# Patient Record
Sex: MALE | Race: WHITE | Employment: PART TIME | URBAN - METROPOLITAN AREA
[De-identification: names, ages, dates, MRNs, and addresses within clinical notes are randomized per-mention and may not be internally consistent; named-entity substitution may affect disease eponyms.]

---

## 2017-03-29 ENCOUNTER — TELEPHONE (OUTPATIENT)
Dept: FAMILY MEDICINE | Facility: CLINIC | Age: 52
End: 2017-03-29

## 2017-03-29 ENCOUNTER — OFFICE VISIT (OUTPATIENT)
Dept: FAMILY MEDICINE | Facility: CLINIC | Age: 52
End: 2017-03-29
Payer: OTHER GOVERNMENT

## 2017-03-29 VITALS
HEIGHT: 67 IN | DIASTOLIC BLOOD PRESSURE: 84 MMHG | BODY MASS INDEX: 31.23 KG/M2 | SYSTOLIC BLOOD PRESSURE: 130 MMHG | OXYGEN SATURATION: 98 % | HEART RATE: 83 BPM | WEIGHT: 199 LBS | TEMPERATURE: 97.1 F

## 2017-03-29 DIAGNOSIS — J10.1 INFLUENZA B: ICD-10-CM

## 2017-03-29 DIAGNOSIS — R05.9 COUGH: ICD-10-CM

## 2017-03-29 DIAGNOSIS — J06.9 UPPER RESPIRATORY TRACT INFECTION, UNSPECIFIED TYPE: Primary | ICD-10-CM

## 2017-03-29 LAB
FLUAV+FLUBV AG SPEC QL: ABNORMAL
FLUAV+FLUBV AG SPEC QL: NEGATIVE
SPECIMEN SOURCE: ABNORMAL

## 2017-03-29 PROCEDURE — 99203 OFFICE O/P NEW LOW 30 MIN: CPT | Performed by: PHYSICIAN ASSISTANT

## 2017-03-29 PROCEDURE — 87804 INFLUENZA ASSAY W/OPTIC: CPT | Performed by: PHYSICIAN ASSISTANT

## 2017-03-29 NOTE — MR AVS SNAPSHOT
"              After Visit Summary   3/29/2017    Ishaan Keen    MRN: [id number]           Patient Information     Date Of Birth          1965        Visit Information        Provider Department      3/29/2017 1:00 PM Corie Campos PA-C [facility]        Today's Diagnoses     Upper respiratory tract infection, unspecified type    -  1    Cough        Screen for colon cancer        Need for hepatitis C screening test        Need for prophylactic vaccination with tetanus-diphtheria (TD)          Care Instructions    I'm sorry you're not feeling well.  Symptoms and exam findings are most consistent with a viral process.  Treatment is supportive.  Re-check anytime with failure to improve as expected or with new concerns.    Electronically Signed By: Corie Campos PA-C          Follow-ups after your visit        Who to contact     If you have questions or need follow up information about today's clinic visit or your schedule please contact [facility] directly at [phone].  Normal or non-critical lab and imaging results will be communicated to you by [employer], letter or phone within 4 business days after the clinic has received the results. If you do not hear from us within 7 days, please contact the clinic through [employer] or phone. If you have a critical or abnormal lab result, we will notify you by phone as soon as possible.  Submit refill requests through [employer] or call your pharmacy and they will forward the refill request to us. Please allow 3 business days for your refill to be completed.          Additional Information About Your Visit        [employer] Information     [employer] lets you send messages to your doctor, view your test results, renew your prescriptions, schedule appointments and more. To sign up, go to [email or web address] . Click on \"Log in\" on the left side of the screen, which will take you to the Welcome page. Then click on \"Sign up Now\" on " "the right side of the page.     You will be asked to enter the access code listed below, as well as some personal information. Please follow the directions to create your username and password.     Your access code is: [email or web address]  Expires: 2017  1:17 PM     Your access code will  in 90 days. If you need help or a new code, please call your [place] clinic or [phone].        Care EveryWhere ID     This is your Care EveryWhere ID. This could be used by other organizations to access your [place] medical records  [id number]        Your Vitals Were     Pulse Temperature Height Pulse Oximetry BMI (Body Mass Index)       83 97.1  F (36.2  C) (Oral) 5' 6.5\" (1.689 m) 98% 31.64 kg/m2        Blood Pressure from Last 3 Encounters:   17 130/84   07 112/78    Weight from Last 3 Encounters:   17 199 lb (90.3 kg)   07 171 lb 6 oz (77.7 kg)              We Performed the Following     Influenza A/B antigen        Primary Care Provider    None Specified       No primary provider on file.        Thank you!     Thank you for choosing [facility]  for your care. Our goal is always to provide you with excellent care. Hearing back from our patients is one way we can continue to improve our services. Please take a few minutes to complete the written survey that you may receive in the mail after your visit with us. Thank you!             Your Updated Medication List - Protect others around you: Learn how to safely use, store and throw away your medicines at www.disposemymeds.org.          This list is accurate as of: 3/29/17  1:17 PM.  Always use your most recent med list.                   Brand Name Dispense Instructions for use    NO ACTIVE MEDICATIONS      .         "

## 2017-03-29 NOTE — TELEPHONE ENCOUNTER
I called patient back. Not sure if he had a question or not as below message is cut off. However I did review Corie Campos PA-C's message to patient and sounds like she left a very detailed message. I advised patient to call us back if he had any questions or concerns. Lizbeth Frye R.N.

## 2017-03-29 NOTE — PATIENT INSTRUCTIONS
I'm sorry you're not feeling well.  Symptoms and exam findings are most consistent with a viral process.  Treatment is supportive.  Re-check anytime with failure to improve as expected or with new concerns.    Electronically Signed By: Corie Campos PA-C

## 2017-03-29 NOTE — NURSING NOTE
"Chief Complaint   Patient presents with     URI       Initial /84  Pulse 83  Temp 97.1  F (36.2  C) (Oral)  Ht 5' 6.5\" (1.689 m)  Wt 199 lb (90.3 kg)  SpO2 98%  BMI 31.64 kg/m2 Estimated body mass index is 31.64 kg/(m^2) as calculated from the following:    Height as of this encounter: 5' 6.5\" (1.689 m).    Weight as of this encounter: 199 lb (90.3 kg).  Medication Reconciliation: complete   Heidi Gongora Medical Assistant      "

## 2017-03-29 NOTE — PROGRESS NOTES
SUBJECTIVE:                                                    Ishaan Keen is a 51 year old male who presents to clinic today for the following health issues:      Acute Illness   Acute illness concerns: Congestion.  Non-smoker, no hx of asthma or pneumonia.  No painful breathing or SOB.  No abx use recently    Onset: x6 days     Fever: no     Chills/Sweats: YES- chills    Headache (location?): no    Sinus Pressure:YES    Conjunctivitis:  YES- watery eyes sometimes     Ear Pain: no    Rhinorrhea: YES- yellow     Congestion: YES- chest/nasal congestion     Sore Throat: no     Cough: YES-productive of green sputum    Wheeze: no    Decreased Appetite: no    Nausea: no    Vomiting: no    Diarrhea:  no    Dysuria/Freq.: no    Fatigue/Achiness: YES- fatigue. No severe body aches.     Sick/Strep Exposure: YES- co - workers have just had colds too.      Therapies Tried and outcome: waltussin , zinc lozenges , increased fluids         Problem list and histories reviewed & adjusted, as indicated.  Additional history: as documented    There is no problem list on file for this patient.    Past Surgical History:   Procedure Laterality Date     VASECTOMY         Social History   Substance Use Topics     Smoking status: Never Smoker     Smokeless tobacco: Not on file     Alcohol use Yes      Comment: 1/week     Family History   Problem Relation Age of Onset     Family History Negative Father       in MVA (hit by drunk )     CANCER Maternal Grandmother      breast     CANCER Maternal Grandfather      skin (Melanoma)         Current Outpatient Prescriptions   Medication Sig Dispense Refill     NO ACTIVE MEDICATIONS .       No Known Allergies    Reviewed and updated as needed this visit by clinical staff  Tobacco  Allergies  Meds  Med Hx  Surg Hx  Fam Hx  Soc Hx      Reviewed and updated as needed this visit by Provider  Tobacco  Allergies  Meds  Med Hx  Surg Hx  Fam Hx  Soc Hx     "    ROS:  Constitutional, HEENT, cardiovascular, pulmonary, gi and gu systems are negative, except as otherwise noted.    OBJECTIVE:                                                    /84  Pulse 83  Temp 97.1  F (36.2  C) (Oral)  Ht 5' 6.5\" (1.689 m)  Wt 199 lb (90.3 kg)  SpO2 98%  BMI 31.64 kg/m2  Body mass index is 31.64 kg/(m^2).  GENERAL: healthy, alert and no distress  EYES: Eyes grossly normal to inspection, PERRL and conjunctivae and sclerae normal  HENT: ear canals and TM's normal, nose and mouth without ulcers or lesions. Mild nasal congestion.   NECK: no adenopathy, no asymmetry, masses, or scars and thyroid normal to palpation  RESP: lungs clear to auscultation - no rales, rhonchi or wheezes. 1 faint end-expiratory wheeze noted, but not otherwise.  CV: regular rate and rhythm, normal S1 S2, no S3 or S4, no murmur, click or rub, no peripheral edema and peripheral pulses strong    Diagnostic Test Results:  Results for orders placed or performed in visit on 03/29/17 (from the past 24 hour(s))   Influenza A/B antigen   Result Value Ref Range    Influenza A/B Agn Specimen Nasal     Influenza A Negative NEG    Influenza B (A) NEG     Positive   Test results must be correlated with clinical data. If necessary, results   should be confirmed by a molecular assay or viral culture.          ASSESSMENT/PLAN:                                                        ICD-10-CM    1. Upper respiratory tract infection, unspecified type J06.9    2. Cough R05 Influenza A/B antigen   1 slight rare wheeze so did offer pt an albuterol inhaler, but he has never needed this in the past so declines today as he has no SOB or subjective complaints of wheezing.  Suspect he just needs a bit more time and supportive measures.  HM reviewed and encouraged to RTC for CPE as well.  See Patient Instructions  Patient Instructions   I'm sorry you're not feeling well.  Symptoms and exam findings are most consistent with a viral " process.  Treatment is supportive.  Re-check anytime with failure to improve as expected or with new concerns.    Electronically Signed By: Corie Campos PA-C      ADDENDUM ------------------  Called pt with influenza results after visit given lab unavailable at time of collection. Let him know that I was surprised about his result given clinically did not have classic reported fever or body aches, but he did test + for influenza B. This is still viral and treatment is supportive. Gave info regarding natural progression, hygiene measures and public health risks. Asked for return call to verify he received our msg.  Electronically Signed By: Corie Campos PA-C

## 2017-04-02 NOTE — PROGRESS NOTES
Pt called with results, see visit addendum from same day of his appt.  Electronically Signed By: Corie Campos PA-C

## 2017-06-23 ENCOUNTER — OFFICE VISIT (OUTPATIENT)
Dept: INTERNAL MEDICINE | Facility: CLINIC | Age: 52
End: 2017-06-23
Payer: OTHER GOVERNMENT

## 2017-06-23 VITALS
WEIGHT: 179 LBS | TEMPERATURE: 97.6 F | HEIGHT: 67 IN | HEART RATE: 74 BPM | SYSTOLIC BLOOD PRESSURE: 122 MMHG | OXYGEN SATURATION: 97 % | DIASTOLIC BLOOD PRESSURE: 68 MMHG | BODY MASS INDEX: 28.09 KG/M2

## 2017-06-23 DIAGNOSIS — D14.0 INVERTED PAPILLOMA OF NASAL CAVITY: ICD-10-CM

## 2017-06-23 DIAGNOSIS — Z01.818 PREOP GENERAL PHYSICAL EXAM: Primary | ICD-10-CM

## 2017-06-23 LAB — HGB BLD-MCNC: 14.3 G/DL (ref 13.3–17.7)

## 2017-06-23 PROCEDURE — 93000 ELECTROCARDIOGRAM COMPLETE: CPT | Performed by: INTERNAL MEDICINE

## 2017-06-23 PROCEDURE — 36415 COLL VENOUS BLD VENIPUNCTURE: CPT | Performed by: INTERNAL MEDICINE

## 2017-06-23 PROCEDURE — 80048 BASIC METABOLIC PNL TOTAL CA: CPT | Performed by: INTERNAL MEDICINE

## 2017-06-23 PROCEDURE — 99214 OFFICE O/P EST MOD 30 MIN: CPT | Performed by: INTERNAL MEDICINE

## 2017-06-23 PROCEDURE — 85018 HEMOGLOBIN: CPT | Performed by: INTERNAL MEDICINE

## 2017-06-23 NOTE — NURSING NOTE
"Chief Complaint   Patient presents with     Pre-Op Exam     06/28/17       Initial /68  Pulse 74  Temp 97.6  F (36.4  C) (Oral)  Ht 5' 6.5\" (1.689 m)  Wt 179 lb (81.2 kg)  SpO2 97%  BMI 28.46 kg/m2 Estimated body mass index is 28.46 kg/(m^2) as calculated from the following:    Height as of this encounter: 5' 6.5\" (1.689 m).    Weight as of this encounter: 179 lb (81.2 kg).  Medication Reconciliation: complete   Jessika Burnette MA      "

## 2017-06-23 NOTE — PROGRESS NOTES
[place] [address]  [facility] [address]  [phone]  Dept: [phone]    PRE-OP EVALUATION:  Today's date: 2017    Ishaan Keen (: 1965) presents for pre-operative evaluation assessment as requested by Dr. Zamora.  He requires evaluation and anesthesia risk assessment prior to undergoing surgery/procedure for treatment of  Inverted Papilloma Rt sinus.  Proposed procedure: sinus surgery .    Date of Surgery/ Procedure: 2017  Time of Surgery/ Procedure: 10:00AM  Hospital/Surgical Facility: [facility]  Fax number for surgical facility: [phone]  Primary Physician: No primary care provider on file.  Type of Anesthesia Anticipated: General    Patient has a Health Care Directive or Living Will:  YES     1. NO - Do you have a history of heart attack, stroke, stent, bypass or surgery on an artery in the head, neck, heart or legs?  2. NO - Do you ever have any pain or discomfort in your chest?  3. NO - Do you have a history of  Heart Failure?  4. NO - Are you troubled by shortness of breath when: walking on the level, up a slight hill or at night?  5. NO - Do you currently have a cold, bronchitis or other respiratory infection?  6. NO - Do you have a cough, shortness of breath or wheezing?  7. NO - Do you sometimes get pains in the calves of your legs when you walk?  8. NO - Do you or anyone in your family have previous history of blood clots?  9. NO - Do you or does anyone in your family have a serious bleeding problem such as prolonged bleeding following surgeries or cuts?  10. NO - Have you ever had problems with anemia or been told to take iron pills?  11. NO - Have you had any abnormal blood loss such as black, tarry or bloody stools, or abnormal vaginal bleeding?  12. NO - Have you ever had a blood transfusion?  13. NO - Have you or any of your relatives ever had problems with anesthesia?  14. NO - Do you have sleep apnea, excessive snoring or  daytime drowsiness?  15. NO - Do you have any prosthetic heart valves?  16. YES - DO YOU HAVE PROSTHETIC JOINTS? Lt hip   17. NO - Is there any chance that you may be pregnant?      HPI:                                                       See problem list for active medical problems.  Problems all longstanding and stable, except as noted/documented.  See ROS for pertinent symptoms related to these conditions.                                                                                                  .    MEDICAL HISTORY:                                                      Patient Active Problem List    Diagnosis Date Noted     PUD (peptic ulcer disease)      Priority: Medium     at age 14        Past Medical History:   Diagnosis Date     PUD (peptic ulcer disease)     at age 14     Past Surgical History:   Procedure Laterality Date     SHOULDER SURGERY      rt rotator cuff     VASECTOMY       Current Outpatient Prescriptions   Medication Sig Dispense Refill     NO ACTIVE MEDICATIONS .       OTC products: None, except as noted above    No Known Allergies     Latex Allergy: NO    Social History   Substance Use Topics     Smoking status: Never Smoker     Smokeless tobacco: Not on file     Alcohol use Yes      Comment: 1/week     History   Drug Use No       REVIEW OF SYSTEMS:                                                    C: NEGATIVE for fever, chills, change in weight  I: NEGATIVE for worrisome rashes, moles or lesions  E: NEGATIVE for vision changes or irritation  E/M: NEGATIVE for ear, mouth and throat problems  R: NEGATIVE for significant cough or SOB  B: NEGATIVE for masses, tenderness or discharge  CV: NEGATIVE for chest pain, palpitations or peripheral edema  GI: NEGATIVE for nausea, abdominal pain, heartburn, or change in bowel habits  : NEGATIVE for frequency, dysuria, or hematuria  M: NEGATIVE for significant arthralgias or myalgia  N: NEGATIVE for weakness, dizziness or paresthesias  E: NEGATIVE  "for temperature intolerance, skin/hair changes  H: NEGATIVE for bleeding problems  P: NEGATIVE for changes in mood or affect    EXAM:                                                    /68  Pulse 74  Temp 97.6  F (36.4  C) (Oral)  Ht 5' 6.5\" (1.689 m)  Wt 179 lb (81.2 kg)  SpO2 97%  BMI 28.46 kg/m2    GENERAL APPEARANCE: healthy, alert and no distress     EYES: EOMI,  PERRL     HENT: ear canals and TM's normal  and mouth without ulcers or lesions     NECK: no adenopathy, no asymmetry, masses, or scars and thyroid normal to palpation     RESP: lungs clear to auscultation - no rales, rhonchi or wheezes     CV: regular rates and rhythm, normal S1 S2,       ABDOMEN:  soft, nontender, no HSM or masses and bowel sounds normal     MS: extremities normal- no gross deformities noted, no evidence of inflammation in joints, FROM in all extremities.     NEURO: Normal strength and tone, sensory exam grossly normal, mentation intact and speech normal     PSYCH: mentation appears normal. 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    REVISED CARDIAC RISK INDEX  The patient has the following serious cardiovascular risks for perioperative complications such as (MI, PE, VFib and 3  AV Block):  No serious cardiac risks      The patient has the following additional risks for perioperative " complications:  No identified additional risks      ICD-10-CM    1. Preop general physical exam Z01.818 EKG 12-lead complete w/read - Clinics     Hemoglobin     Basic metabolic panel   2. Inverted papilloma of nasal cavity D14.0        RECOMMENDATIONS:                                                          Anticoagulant or Antiplatelet Medication Use  ASPIRIN: Discontinue ASA 7-10 days prior to procedure to reduce bleeding risk.    NSAIDS:   Stop 3 days prior to surgery            Signed Electronically by: Juan Luis Swift MD    Copy of this evaluation report is provided to requesting physician.    [facility] Preop Guidelines

## 2017-06-23 NOTE — LETTER
[facility]  [address], [address]  [place]  [address]      June 26, 2017      Ishaan Keen                                                                                                                            [address] [facility] [address]            Dear Ishaan,    The results of your recent tests were normal.  Enclosed is a copy of the results.      If you have any questions or concerns, please contact our office at [phone].        Sincerely,      Alvin Swift M.D.

## 2017-06-24 LAB
ANION GAP SERPL CALCULATED.3IONS-SCNC: 7 MMOL/L (ref 3–14)
BUN SERPL-MCNC: 28 MG/DL (ref 7–30)
CALCIUM SERPL-MCNC: 9.1 MG/DL (ref 8.5–10.1)
CHLORIDE SERPL-SCNC: 108 MMOL/L (ref 94–109)
CO2 SERPL-SCNC: 28 MMOL/L (ref 20–32)
CREAT SERPL-MCNC: 1.09 MG/DL (ref 0.66–1.25)
GFR SERPL CREATININE-BSD FRML MDRD: 71 ML/MIN/1.7M2
GLUCOSE SERPL-MCNC: 93 MG/DL (ref 70–99)
POTASSIUM SERPL-SCNC: 4.3 MMOL/L (ref 3.4–5.3)
SODIUM SERPL-SCNC: 143 MMOL/L (ref 133–144)

## 2017-06-28 ENCOUNTER — HOSPITAL PATHOLOGY (OUTPATIENT)
Dept: OTHER | Facility: CLINIC | Age: 52
End: 2017-06-28

## 2017-07-13 LAB — COPATH REPORT: NORMAL

## 2019-05-06 ENCOUNTER — HOSPITAL ENCOUNTER (EMERGENCY)
Facility: CLINIC | Age: 54
Discharge: HOME | End: 2019-05-06
Attending: EMERGENCY MEDICINE | Admitting: EMERGENCY MEDICINE
Payer: OTHER GOVERNMENT

## 2019-05-06 VITALS
OXYGEN SATURATION: 98 % | SYSTOLIC BLOOD PRESSURE: 140 MMHG | HEART RATE: 67 BPM | TEMPERATURE: 97.9 F | RESPIRATION RATE: 19 BRPM | DIASTOLIC BLOOD PRESSURE: 89 MMHG | BODY MASS INDEX: 30.68 KG/M2 | WEIGHT: 193 LBS

## 2019-05-06 DIAGNOSIS — N28.9 RENAL INSUFFICIENCY: Primary | ICD-10-CM

## 2019-05-06 DIAGNOSIS — R55 SYNCOPE, EFFORT: ICD-10-CM

## 2019-05-06 LAB
ANION GAP SERPL CALCULATED.3IONS-SCNC: 12 MMOL/L (ref 3–14)
BASOPHILS # BLD AUTO: 0.1 10E9/L (ref 0–0.2)
BASOPHILS NFR BLD AUTO: 0.8 %
BUN SERPL-MCNC: 19 MG/DL (ref 7–30)
CALCIUM SERPL-MCNC: 9 MG/DL (ref 8.5–10.1)
CHLORIDE SERPL-SCNC: 110 MMOL/L (ref 94–109)
CO2 SERPL-SCNC: 21 MMOL/L (ref 20–32)
CREAT SERPL-MCNC: 1.27 MG/DL (ref 0.66–1.25)
D DIMER PPP FEU-MCNC: 0.3 UG/ML FEU (ref 0–0.5)
DIFFERENTIAL METHOD BLD: NORMAL
EOSINOPHIL # BLD AUTO: 0.1 10E9/L (ref 0–0.7)
EOSINOPHIL NFR BLD AUTO: 1.1 %
ERYTHROCYTE [DISTWIDTH] IN BLOOD BY AUTOMATED COUNT: 12.4 % (ref 10–15)
GFR SERPL CREATININE-BSD FRML MDRD: 63 ML/MIN/{1.73_M2}
GLUCOSE SERPL-MCNC: 119 MG/DL (ref 70–99)
HCT VFR BLD AUTO: 44.7 % (ref 40–53)
HGB BLD-MCNC: 15.1 G/DL (ref 13.3–17.7)
IMM GRANULOCYTES # BLD: 0 10E9/L (ref 0–0.4)
IMM GRANULOCYTES NFR BLD: 0.3 %
INTERPRETATION ECG - MUSE: NORMAL
LYMPHOCYTES # BLD AUTO: 1.6 10E9/L (ref 0.8–5.3)
LYMPHOCYTES NFR BLD AUTO: 24.4 %
MCH RBC QN AUTO: 32.6 PG (ref 26.5–33)
MCHC RBC AUTO-ENTMCNC: 33.8 G/DL (ref 31.5–36.5)
MCV RBC AUTO: 97 FL (ref 78–100)
MONOCYTES # BLD AUTO: 0.5 10E9/L (ref 0–1.3)
MONOCYTES NFR BLD AUTO: 7.2 %
NEUTROPHILS # BLD AUTO: 4.4 10E9/L (ref 1.6–8.3)
NEUTROPHILS NFR BLD AUTO: 66.2 %
NRBC # BLD AUTO: 0 10*3/UL
NRBC BLD AUTO-RTO: 0 /100
PLATELET # BLD AUTO: 253 10E9/L (ref 150–450)
POTASSIUM SERPL-SCNC: 3.4 MMOL/L (ref 3.4–5.3)
RBC # BLD AUTO: 4.63 10E12/L (ref 4.4–5.9)
SODIUM SERPL-SCNC: 143 MMOL/L (ref 133–144)
TROPONIN I SERPL-MCNC: <0.015 UG/L (ref 0–0.04)
WBC # BLD AUTO: 6.7 10E9/L (ref 4–11)

## 2019-05-06 PROCEDURE — 96360 HYDRATION IV INFUSION INIT: CPT

## 2019-05-06 PROCEDURE — 25000128 H RX IP 250 OP 636: Performed by: PHYSICIAN ASSISTANT

## 2019-05-06 PROCEDURE — 80048 BASIC METABOLIC PNL TOTAL CA: CPT | Performed by: PHYSICIAN ASSISTANT

## 2019-05-06 PROCEDURE — 85025 COMPLETE CBC W/AUTO DIFF WBC: CPT | Performed by: PHYSICIAN ASSISTANT

## 2019-05-06 PROCEDURE — 84484 ASSAY OF TROPONIN QUANT: CPT | Performed by: PHYSICIAN ASSISTANT

## 2019-05-06 PROCEDURE — 96361 HYDRATE IV INFUSION ADD-ON: CPT

## 2019-05-06 PROCEDURE — 99284 EMERGENCY DEPT VISIT MOD MDM: CPT | Mod: 25

## 2019-05-06 PROCEDURE — 99203 OFFICE O/P NEW LOW 30 MIN: CPT | Performed by: HOSPITALIST

## 2019-05-06 PROCEDURE — 93005 ELECTROCARDIOGRAM TRACING: CPT

## 2019-05-06 PROCEDURE — 85379 FIBRIN DEGRADATION QUANT: CPT | Performed by: PHYSICIAN ASSISTANT

## 2019-05-06 RX ORDER — VALACYCLOVIR HYDROCHLORIDE 500 MG/1
500 TABLET, FILM COATED ORAL DAILY
COMMUNITY
End: 2019-12-19

## 2019-05-06 RX ORDER — VALACYCLOVIR HYDROCHLORIDE 1 G/1
500 TABLET, FILM COATED ORAL DAILY
COMMUNITY
End: 2019-05-06

## 2019-05-06 RX ORDER — OMEPRAZOLE 40 MG/1
40 CAPSULE, DELAYED RELEASE ORAL DAILY
COMMUNITY
End: 2020-04-27

## 2019-05-06 RX ADMIN — SODIUM CHLORIDE 1000 ML: 9 INJECTION, SOLUTION INTRAVENOUS at 11:59

## 2019-05-06 ASSESSMENT — ENCOUNTER SYMPTOMS
WEAKNESS: 0
ARTHRALGIAS: 0
MYALGIAS: 0
PALPITATIONS: 0
SHORTNESS OF BREATH: 0
FEVER: 0
SEIZURES: 0
DIARRHEA: 0
CHILLS: 0
VOMITING: 0
NAUSEA: 0
CHEST TIGHTNESS: 0
COUGH: 0
NUMBNESS: 0
LIGHT-HEADEDNESS: 1

## 2019-05-06 NOTE — PHARMACY-ADMISSION MEDICATION HISTORY
Admission medication history interview status for this patient is complete. See [facility] admission navigator for allergy information, prior to admission medications and immunization status.     Medication history interview source(s):Patient  Medication history resources (including written lists, pill bottles, clinic record): picture of meds on patient's phone  Primary pharmacy: [facility]    Changes made to PTA medication list:  Added: prilosec  Deleted: nexium  Changed: valtrex    Actions taken by pharmacist (provider contacted, etc):None     Additional medication history information:None    Medication reconciliation/reorder completed by provider prior to medication history? No    Do you take OTC medications (eg tylenol, ibuprofen, fish oil, eye/ear drops, etc)? No (Y/N)    For patients on insulin therapy: No (Y/N)    Prior to Admission medications    Medication Sig Last Dose Taking? Auth Provider   omeprazole (PRILOSEC) 40 MG DR capsule Take 40 mg by mouth daily 5/6/2019 at am Yes Unknown, Entered By History   valACYclovir (VALTREX) 500 MG tablet Take 500 mg by mouth daily 5/6/2019 at am Yes Unknown, Entered By History

## 2019-05-06 NOTE — ED PROVIDER NOTES
History     Chief Complaint:  Syncope     HPI   Ishaan Keen is a 53 year old male who presents to the ED via EMS for evaluation of syncope.  The patient reports that he was working out just prior to presentation to the ED when he started feeling lightheaded and diaphoretic, so he proceeded to stand near the wall and attempted to control his breathing.  He states that that is the last thing that he remembers.  He was subsequently told by other members at the gym that he had a syncopal episode, and had minor limb jerking for approximately 30 to 40 seconds while down.  He states that he immediately came to and sat up.  He was provided some Gatorade, and then EMS arrived.  He denies any postictal state.  He denies any chest pain or shortness of breath.  He denies any nausea, vomiting, or recent illness.  He states that he has a history of one other syncopal episode at the gym.  He notes that he did not seek care at that time.  He states that he ate a bowl of cereal for breakfast.    Allergies:  No Known Allergies     Medications:      esomeprazole (NEXIUM) 20 MG DR capsule   valACYclovir (VALTREX) 1000 mg tablet   NO ACTIVE MEDICATIONS       Past Medical History:    Past Medical History:   Diagnosis Date     PUD (peptic ulcer disease)        Patient Active Problem List    Diagnosis Date Noted     PUD (peptic ulcer disease)      Priority: Medium     at age 14          Past Surgical History:    Past Surgical History:   Procedure Laterality Date     SHOULDER SURGERY      rt rotator cuff     VASECTOMY          Family History:    family history includes Cancer in his maternal grandfather and maternal grandmother; Family History Negative in his father.    Social History:   reports that he has never smoked. He does not have any smokeless tobacco history on file. He reports that he drinks alcohol. He reports that he does not use drugs.    PCP: No primary care provider on file.     Review of Systems   Constitutional:  Negative for chills and fever.   Respiratory: Negative for cough, chest tightness and shortness of breath.    Cardiovascular: Negative for chest pain and palpitations.   Gastrointestinal: Negative for diarrhea, nausea and vomiting.   Musculoskeletal: Negative for arthralgias and myalgias.   Neurological: Positive for syncope and light-headedness. Negative for seizures, weakness and numbness.   All other systems reviewed and are negative.        Physical Exam     Patient Vitals for the past 24 hrs:   BP Temp Temp src Pulse Resp SpO2 Weight   05/06/19 1230 118/81 -- -- 69 -- 95 % --   05/06/19 1215 114/74 -- -- 66 -- 96 % --   05/06/19 1130 102/73 -- -- 69 -- 93 % --   05/06/19 1115 99/64 -- -- 75 -- 93 % --   05/06/19 1110 100/66 97.9  F (36.6  C) Oral 71 16 97 % 87.5 kg (193 lb)        Physical Exam  Constitutional: Pleasant. Cooperative.  Eyes: Pupils equally round and reactive  HENT: Head is normal in appearance. Oropharynx is normal with moist mucus membranes. Non-tender scalp.   Cardiovascular: Regular rate and rhythm and without murmurs.  Respiratory: Normal respiratory effort, lungs are clear bilaterally.  GI: Abdomen is soft, non-tender, non-distended. No guarding, rebound, or rigidity.  Musculoskeletal: Non-tender extremities.  Skin: Normal, without rash. No evidence of lacerations or abrasions.  Neurologic: Cranial nerves II-XII intact, nl cognition, no focal deficits. Alert and oriented x 3. Normal  strength. Normal leg raise. Sensation to light touch intact throughout all 4 extremities. 5/5 strength with dorsiflexion and plantarflexion bilaterally. No pronator drift. Normal finger nose finger.   Psychiatric: Normal affect.  Nursing notes and vital signs reviewed.    Emergency Department Course     ECG:  ECG taken at 1113, ECG read at 1120  Normal sinus rhythm  Normal ECG  Rate 68 bpm. OK interval 162 ms. QRS duration 92 ms. QT/QTc 406/461 ms. P-R-T axes 20 63 17.    Laboratory:  Labs Ordered and  Resulted from Time of ED Arrival Up to the Time of Departure from the ED   BASIC METABOLIC PANEL - Abnormal; Notable for the following components:       Result Value    Chloride 110 (*)     Glucose 119 (*)     Creatinine 1.27 (*)     All other components within normal limits   GLUCOSE MONITOR NURSING POCT   CBC WITH PLATELETS DIFFERENTIAL   TROPONIN I   D DIMER QUANTITATIVE   PERIPHERAL IV CATHETER   ORTHOSTATIC BLOOD PRESSURE AND PULSE     Interventions:  Medications   0.9% sodium chloride BOLUS (1,000 mLs Intravenous New Bag 5/6/19 1159)        Emergency Department Course:    Nursing notes and vitals reviewed.    I performed an exam of the patient as documented above.     WELLS PE SCORE for Pulmonary Embolism likelihood (calculator)  Background  Calculates likelihood of PE based on 7 criteria including suspected DVT, HR>100, recent surgery or immobilization, prior VTE, hemoptysis, active cancer.  Data  has PUD (peptic ulcer disease) on their problem list.   has a past surgical history that includes vasectomy and shoulder surgery.  Pulse: 71  Criteria   Of possible 12.5 points for 7 possible items:  NEGATIVE  Interpretation  Wells PE Score: 0  Score 0-2 points: Low PE probability (3.6% risk)    1300 Patient reevaluated. He adds that this is not his second syncopal episode. He states that he actually had a syncope episode associated with exertion in the 80s while in the . He additonally adds that about 2.5 weeks ago he had six episodes of fina vu, as well as abnormal smells.     After shared decision making with patient and hospitalist, patient will be discharged to home.      Impression & Plan      DDx:  Anemia, PE, cardiac syncope, orthostatic syncope, seizure    Medical Decision Making:  Ishaan Keen is a 53 year old male who presents to the emergency department today for evaluation of exertional syncope.  Patient reports that he was working out just prior to presentation when he became lightheaded  and had a syncopal episode.  He notes that he immediately came to and was transported via EMS to the ED for further evaluation.  He denies any chest pain or shortness of breath.  Patient reports that he has actually had two prior episodes of exertional syncope.  One episode occurred approximately 1 month ago, and another one occurred in the 80s.  His partner presented subsequently, and she adds that he has had some recent atypical symptoms as well, including multiple déjà vu episodes, as well as abnormal sensations and smells. She reports some concern for seizure-like activity. See above for additional details.    On evaluation, the patient's BP was 100/66, following administration of a small amount of fluids via EMS. Patient was initially hypotension per EMS. Pulse was 71, and the patient had a normal SpO2. Physical exam was completely benign, including neuro exam.     Differential diagnosis was broad, see above. ECG and lab work were obtained, including CBC, BMP, troponin, and d dimer. Orthostatic vitals were also obtained. Other than a mildly elevated Creatinine, other lab work was completely benign. Patient's orthostatics revealed a BP of 99/64 and HR of 68 when laying down, and a BP of 97/73 and pulse of 64 when standing.    Given above work up, patient's presentation is concerning for orthostatic syncope (possibly secondary to dehydration) vs cardiac syncope. No evidence of anemia, PE, or seizure at this time. I discussed the patient's presentation with the hospitalist. After shared decision making with the patient and the hospitalist, patient elected to go home, with further outpatient work up including stress echocardiogram and lab work up over the next 2-3 days. Patient was advised to stay hydrated and avoid exertional activity until after the echocardiogram.     All questions were answered. The patient was discharged to home in stable condition.     Diagnosis:    ICD-10-CM    1. Syncope, effort R55          Discharge Medications:  New Prescriptions    No medications on file     Christian Dawkins PA-C  [facility] ED  May 6, 2019          Christian Dawkins PA-C  05/06/19 1447

## 2019-05-06 NOTE — ED TRIAGE NOTES
Onset of dizziness and syncope while working out at the gym.  ABCDs intact at this time.  States he has had same thing happen before.

## 2019-05-06 NOTE — ED AVS SNAPSHOT
[facility] Emergency Department  [address]  [facility] [address]  Phone:  [phone]  Fax:  [phone]                                    Ishaan Keen   MRN: [id number]    Department:  [facility] Emergency Department   Date of Visit:  5/6/2019           After Visit Summary Signature Page    I have received my discharge instructions, and my questions have been answered. I have discussed any challenges I see with this plan with the nurse or doctor.    ..........................................................................................................................................  Patient/Patient Representative Signature      ..........................................................................................................................................  Patient Representative Print Name and Relationship to Patient    ..................................................               ................................................  Date                                   Time    ..........................................................................................................................................  Reviewed by Signature/Title    ...................................................              ..............................................  Date                                               Time          22EPIC Rev 08/18

## 2019-05-06 NOTE — ED NOTES
"SO now here, she states that patient has history of \"seizures\".  Patient denied history of seizures when asked by myself, PA, and MD.  SO states that he is not have syncopal episodes, but seizures.  She has not witness this, he states that he had a similar episode while at the gym one other time, but he was not evaluated.  MD informed.  "

## 2019-05-06 NOTE — ED NOTES
Bed: ED07  Expected date: 5/6/19  Expected time: 10:58 AM  Means of arrival: Ambulance  Comments:  bv1

## 2019-05-06 NOTE — ED NOTES
[facility]  ED Nurse Handoff Report    Ishaan Keen is a 53 year old male   ED Chief complaint: syncope while at the gym  . ED Diagnosis:   Final diagnoses:   Syncope, effort     Allergies: No Known Allergies    Code Status: Full Code  Activity level - Baseline/Home:  Independent. Activity Level - Current:   Independent. Lift room needed: No. Bariatric: No   Needed: No   Isolation: No. Infection: Not Applicable.     Vital Signs:   Vitals:    05/06/19 1115 05/06/19 1130 05/06/19 1215 05/06/19 1230   BP: 99/64 102/73 114/74 118/81   Pulse: 75 69 66 69   Resp:       Temp:       TempSrc:       SpO2: 93% 93% 96% 95%   Weight:           Cardiac Rhythm:  ,      Pain level: 0-10 Pain Scale: 0  Patient confused: No. Patient Falls Risk: Yes.   Elimination Status: Has voided   Patient Report - Initial Complaint: possible syncopal episode. Focused Assessment: patient had possible syncopal episode while working out at the gym.  States that he had the same type of episode previously while working out at the gym, but was not evaluated.  ABCDs intact upon arrival, diaphoretic.  Denies pain, nausea, vomiting, diarrhea.     Tests Performed: labs. Abnormal Results:   Labs Ordered and Resulted from Time of ED Arrival Up to the Time of Departure from the ED   BASIC METABOLIC PANEL - Abnormal; Notable for the following components:       Result Value    Chloride 110 (*)     Glucose 119 (*)     Creatinine 1.27 (*)     All other components within normal limits   GLUCOSE MONITOR NURSING POCT   CBC WITH PLATELETS DIFFERENTIAL   TROPONIN I   D DIMER QUANTITATIVE   PERIPHERAL IV CATHETER   ORTHOSTATIC BLOOD PRESSURE AND PULSE   .   Treatments provided: fluid bolus, EKG, monitoring  Family Comments: SO here  OBS brochure/video discussed/provided to patient:  N/A  ED Medications:   Medications   0.9% sodium chloride BOLUS (1,000 mLs Intravenous New Bag 5/6/19 2063)     Drips infusing:  No  For the majority of the  shift, the patient's behavior Green. Interventions performed were n/a.     Severe Sepsis OR Septic Shock Diagnosis Present: No      ED Nurse Name/Phone Number: Sandy Gamble,   1:02 PM

## 2019-05-06 NOTE — CONSULTS
[facility]    History and Physical/ED Consult - Hospitalist Service       Date of Admission:  5/6/2019    Assessment & Plan   Ishaan Keen is a 53 year old healthy male with history GERD, nasal polyps admitted on 5/6/2019 after a syncopal episode at the gym.    Syncope    - likely caused by low blood pressure (EMS notes show BP was 87/53)    - Cr elevated which is consistent with some dehydration, although patient states he has been drinking water    - offered admission to patient/wife, but he prefers to go home    - I think this is reasonable as long as he has follow-up    - I have scheduled an ECHO for him on Thursday    - follow-up appt with Deepika Brian NP on Thursday (I will email her)    - ordered outpt labs to be done on wed to follow-up on Cr    Patient and wife are both comfortable with this plan    I spoke with the ED provider as well.     Diet: Diet    DVT Prophylaxis: NA  Ham Catheter: not present  Code Status: Full    Disposition Plan   Expected discharge: Today, recommended to prior living arrangement (dischagr from ED)  Entered: Glenn Calles MD 05/06/2019, 2:00 PM     The patient's care was discussed with the ED physician and PA (Dr. Nichols and Christian Dawkins).    Glenn Calles MD  [facility]    ______________________________________________________________________    Chief Complaint   syncope    History is obtained from the patient    History of Present Illness   Ishaan Keen is a 53 year old healthy male with history of sinus polyps who came to the ED via EMS for syncope at the gym. Patient states he was working out (about 1 hour into his workout- mostly lifting weights) when he started to feel dizzy (lightheaded). He stopped working out and went to a wall to stand and rest. The next thing he knew he was on the floor and there were people around him trying to put towels under his head. EMS was called. Patient states he was aware of everything going on. No  "seizure activity. He states he continued to feel lightheaded and things \"were white\" until he was loaded into the ambulance at which point he started feeling better. He now feels at his baseline after IVF in the ED. He states this has happened to him twice before. One was also when he was working out at the gym. He did not see a medical provider at those times. He states he just returned from 2 weeks in [place] putting his mom into memory care. He felt like he was keeping hydrated. This was his first work-out in 2 weeks. He did have 2 alcoholic drinks last night. He denies chest pain, sob, abdo pain, n/v/d. No recent URI or urinary symptoms. No fever or chills. His son had an MI. His dad  in a car accident. Non-smoker.    Review of Systems    The 10 point Review of Systems is negative other than noted in the HPI or here.     Past Medical History    I have reviewed this patient's medical history and updated it with pertinent information if needed.   Past Medical History:   Diagnosis Date     PUD (peptic ulcer disease)     at age 14       Past Surgical History   I have reviewed this patient's surgical history and updated it with pertinent information if needed.  Past Surgical History:   Procedure Laterality Date     SHOULDER SURGERY      rt rotator cuff     VASECTOMY         Social History   I have reviewed this patient's social history and updated it with pertinent information if needed.  Social History     Tobacco Use     Smoking status: Never Smoker   Substance Use Topics     Alcohol use: Yes     Comment: 10 drinks per week/alcohol     Drug use: No       Family History   I have reviewed this patient's family history and updated it with pertinent information if needed.   Family History   Problem Relation Age of Onset     Family History Negative Father          in MVA (hit by drunk )     Cancer Maternal Grandmother         breast     Cancer Maternal Grandfather         skin (Melanoma)       Prior to " Admission Medications   Prior to Admission Medications   Prescriptions Last Dose Informant Patient Reported? Taking?   omeprazole (PRILOSEC) 40 MG DR capsule 5/6/2019 at am  Yes Yes   Sig: Take 40 mg by mouth daily   valACYclovir (VALTREX) 500 MG tablet 5/6/2019 at am  Yes Yes   Sig: Take 500 mg by mouth daily      Facility-Administered Medications: None     Allergies   No Known Allergies    Physical Exam   Vital Signs: Temp: 97.9  F (36.6  C) Temp src: Oral BP: 139/82 Pulse: 66 Heart Rate: 65 Resp: 17 SpO2: 98 % O2 Device: None (Room air)    Weight: 193 lbs 0 oz    Constitutional: awake, alert, cooperative, no apparent distress, and appears stated age  Respiratory: No increased work of breathing, good air exchange, clear to auscultation bilaterally, no crackles or wheezing  Cardiovascular: Normal apical impulse, regular rate and rhythm, normal S1 and S2, no S3 or S4, and no murmur noted  GI: No scars, normal bowel sounds, soft, non-distended, non-tender, no masses palpated, no hepatosplenomegally    Data   Data reviewed today: I reviewed all medications, new labs and imaging results over the last 24 hours. I personally reviewed the EKG tracing showing NSR, no st/t changes.      Most Recent 3 CBC's:  Recent Labs   Lab Test 05/06/19  1122 06/23/17  1338   WBC 6.7  --    HGB 15.1 14.3   MCV 97  --      --      Most Recent 3 BMP's:  Recent Labs   Lab Test 05/06/19  1122 06/23/17  1338    143   POTASSIUM 3.4 4.3   CHLORIDE 110* 108   CO2 21 28   BUN 19 28   CR 1.27* 1.09   ANIONGAP 12 7   JONATHAN 9.0 9.1   * 93

## 2019-05-06 NOTE — ED PROVIDER NOTES
Emergency Department Attending Supervision Note  5/6/2019  11:19 AM    I evaluated this patient in conjunction with Christian Dawkins PA-C.    Briefly, the patient was exercising at the gym (weights, light) when he became light-headed and sweaty. He stepped off to the side and leaned against the wall before passing out. He was unresponsive initially, and EMS got an initial BP of 87/53 with a pulse of 75. He states this has happened to him in the past while exercising dating all the way back to 1984, but most recently one month ago. No injuries, no headache, pain. Normally when this happens he will get some sort of sensory fina vu, but not this time. A bystander reported seeing him shaking for 30-40 seconds. Currently he has no chest pain or shortness of breath, but states that he had some shortness of breath during his exertion.     Exam:  General: Patient is alert and interactive when I enter the room  Head:  The scalp, face, and head appear normal  Eyes:  The pupils are equal, round, and reactive to light    Conjunctivae and sclerae are normal  ENT:    External acoustic canals are normal    The oropharynx is normal without erythema.     Uvula is in the midline  Neck:  Normal range of motion  CV:  Regular rate. S1/S2. No murmurs.   Resp:  Lungs are clear without wheezes or rales. No distress  GI:  Abdomen is soft, no rigidity, guarding, or rebound    No distension. No tenderness to palpation in any quadrant.     MS:  Normal tone. Joints grossly normal without effusions.     No asymmetric leg swelling, calf or thigh tenderness.      Normal motor assessment of all extremities.  Skin:  No rash or lesions noted. Normal capillary refill noted  Neuro: Speech is normal and fluent. Face is symmetric.     Moving all extremities well. CN's intact.   Psych:  Awake. Alert.  Normal affect.  Appropriate interactions.  Lymph: No anterior cervical lymphadenopathy noted    Results:    ED course:    My impression is syncope, unclear  etiology. Features suggest dehydration as a cause (lightheadedness, hx with extreme exertion) vs seizure (smell/taste/fina vous sensation prior in past) with some element of vasovagal hyperactivity.  Plan outpatient stress echo and outpatient workup.     Diagnosis    ICD-10-CM    1. Syncope, effort R55        Disposition  Home    Aditya Reyez MD Thielen, Scott Daniel, MD  05/06/19 1350       Aditya Reyez MD  05/06/19 1350

## 2019-05-07 ENCOUNTER — HOSPITAL ENCOUNTER (OUTPATIENT)
Dept: LAB | Facility: CLINIC | Age: 54
Discharge: HOME | End: 2019-05-07
Attending: HOSPITALIST | Admitting: HOSPITALIST
Payer: OTHER GOVERNMENT

## 2019-05-07 DIAGNOSIS — N28.9 RENAL INSUFFICIENCY: ICD-10-CM

## 2019-05-07 LAB
ANION GAP SERPL CALCULATED.3IONS-SCNC: 4 MMOL/L (ref 3–14)
BUN SERPL-MCNC: 14 MG/DL (ref 7–30)
CALCIUM SERPL-MCNC: 8.4 MG/DL (ref 8.5–10.1)
CHLORIDE SERPL-SCNC: 109 MMOL/L (ref 94–109)
CO2 SERPL-SCNC: 28 MMOL/L (ref 20–32)
CREAT SERPL-MCNC: 1.07 MG/DL (ref 0.66–1.25)
GFR SERPL CREATININE-BSD FRML MDRD: 78 ML/MIN/{1.73_M2}
GLUCOSE SERPL-MCNC: 91 MG/DL (ref 70–99)
POTASSIUM SERPL-SCNC: 3.6 MMOL/L (ref 3.4–5.3)
SODIUM SERPL-SCNC: 141 MMOL/L (ref 133–144)

## 2019-05-07 PROCEDURE — 36415 COLL VENOUS BLD VENIPUNCTURE: CPT | Performed by: HOSPITALIST

## 2019-05-07 PROCEDURE — 80048 BASIC METABOLIC PNL TOTAL CA: CPT | Performed by: HOSPITALIST

## 2019-05-09 ENCOUNTER — OFFICE VISIT (OUTPATIENT)
Dept: INTERNAL MEDICINE | Facility: CLINIC | Age: 54
End: 2019-05-09
Payer: OTHER GOVERNMENT

## 2019-05-09 ENCOUNTER — HOSPITAL ENCOUNTER (OUTPATIENT)
Dept: CARDIOLOGY | Facility: CLINIC | Age: 54
Discharge: HOME | End: 2019-05-09
Attending: HOSPITALIST | Admitting: HOSPITALIST
Payer: OTHER GOVERNMENT

## 2019-05-09 VITALS
SYSTOLIC BLOOD PRESSURE: 134 MMHG | DIASTOLIC BLOOD PRESSURE: 80 MMHG | RESPIRATION RATE: 20 BRPM | OXYGEN SATURATION: 99 % | BODY MASS INDEX: 32.11 KG/M2 | HEART RATE: 73 BPM | TEMPERATURE: 97.7 F | HEIGHT: 66 IN | WEIGHT: 199.8 LBS

## 2019-05-09 DIAGNOSIS — N28.9 ACUTE KIDNEY INSUFFICIENCY: Primary | ICD-10-CM

## 2019-05-09 DIAGNOSIS — R55 SYNCOPE, EFFORT: ICD-10-CM

## 2019-05-09 PROCEDURE — 99213 OFFICE O/P EST LOW 20 MIN: CPT | Performed by: NURSE PRACTITIONER

## 2019-05-09 PROCEDURE — 93306 TTE W/DOPPLER COMPLETE: CPT | Mod: 26 | Performed by: INTERNAL MEDICINE

## 2019-05-09 PROCEDURE — 93306 TTE W/DOPPLER COMPLETE: CPT

## 2019-05-09 ASSESSMENT — MIFFLIN-ST. JEOR: SCORE: 1684.04

## 2019-05-31 ASSESSMENT — ENCOUNTER SYMPTOMS
COUGH: 0
NAUSEA: 0
NERVOUS/ANXIOUS: 0
EYE PAIN: 0
SORE THROAT: 0
HEARTBURN: 0
ABDOMINAL PAIN: 0
PARESTHESIAS: 0
SHORTNESS OF BREATH: 0
HEMATURIA: 0
CONSTIPATION: 0
CHILLS: 0
DIARRHEA: 0
HEADACHES: 0
FEVER: 0
JOINT SWELLING: 0
DIZZINESS: 0
WEAKNESS: 0
HEMATOCHEZIA: 0
FREQUENCY: 0
PALPITATIONS: 0
MYALGIAS: 0
ARTHRALGIAS: 0
DYSURIA: 0

## 2019-06-06 ENCOUNTER — OFFICE VISIT (OUTPATIENT)
Dept: INTERNAL MEDICINE | Facility: CLINIC | Age: 54
End: 2019-06-06
Payer: OTHER GOVERNMENT

## 2019-06-06 VITALS
WEIGHT: 199 LBS | BODY MASS INDEX: 31.98 KG/M2 | DIASTOLIC BLOOD PRESSURE: 76 MMHG | SYSTOLIC BLOOD PRESSURE: 128 MMHG | OXYGEN SATURATION: 95 % | HEIGHT: 66 IN | RESPIRATION RATE: 15 BRPM | TEMPERATURE: 98 F | HEART RATE: 78 BPM

## 2019-06-06 DIAGNOSIS — A60.00 GENITAL HERPES SIMPLEX, UNSPECIFIED SITE: ICD-10-CM

## 2019-06-06 DIAGNOSIS — Z00.00 ROUTINE HISTORY AND PHYSICAL EXAMINATION OF ADULT: Primary | ICD-10-CM

## 2019-06-06 LAB
HEMOCCULT STL QL IA: NEGATIVE
HGB BLD-MCNC: 15.2 G/DL (ref 13.3–17.7)

## 2019-06-06 PROCEDURE — 85018 HEMOGLOBIN: CPT | Performed by: INTERNAL MEDICINE

## 2019-06-06 PROCEDURE — 36415 COLL VENOUS BLD VENIPUNCTURE: CPT | Performed by: INTERNAL MEDICINE

## 2019-06-06 PROCEDURE — 80061 LIPID PANEL: CPT | Performed by: INTERNAL MEDICINE

## 2019-06-06 PROCEDURE — 82274 ASSAY TEST FOR BLOOD FECAL: CPT | Performed by: INTERNAL MEDICINE

## 2019-06-06 PROCEDURE — G0103 PSA SCREENING: HCPCS | Performed by: INTERNAL MEDICINE

## 2019-06-06 PROCEDURE — 99396 PREV VISIT EST AGE 40-64: CPT | Performed by: INTERNAL MEDICINE

## 2019-06-06 PROCEDURE — 80053 COMPREHEN METABOLIC PANEL: CPT | Performed by: INTERNAL MEDICINE

## 2019-06-06 RX ORDER — MULTIVIT WITH MINERALS/LUTEIN
1 TABLET ORAL DAILY
COMMUNITY

## 2019-06-06 ASSESSMENT — ENCOUNTER SYMPTOMS
SORE THROAT: 0
JOINT SWELLING: 0
ABDOMINAL PAIN: 0
SHORTNESS OF BREATH: 0
HEARTBURN: 0
CHILLS: 0
HEMATOCHEZIA: 0
HEADACHES: 0
PALPITATIONS: 0
FEVER: 0
PARESTHESIAS: 0
DIARRHEA: 0
DIZZINESS: 0
ARTHRALGIAS: 0
HEMATURIA: 0
NAUSEA: 0
FREQUENCY: 0
NERVOUS/ANXIOUS: 0
EYE PAIN: 0
WEAKNESS: 0
COUGH: 0
CONSTIPATION: 0
DYSURIA: 0
MYALGIAS: 0

## 2019-06-06 ASSESSMENT — MIFFLIN-ST. JEOR: SCORE: 1680.41

## 2019-06-06 NOTE — PROGRESS NOTES
SUBJECTIVE:   CC: Ishaan Keen is an 53 year old male who presents for preventative health visit.       Healthy Habits:     Getting at least 3 servings of Calcium per day:  Yes    Bi-annual eye exam:  NO    Dental care twice a year:  Yes    Sleep apnea or symptoms of sleep apnea:  None    Diet:  Carbohydrate counting and Gluten-free/reduced    Frequency of exercise:  2-3 days/week    Duration of exercise:  30-45 minutes    Taking medications regularly:  Yes    Medication side effects:  None    PHQ-2 Total Score: 0    Additional concerns today:  Yes    Patient says he is not interested in colonoscopy at this time but willing to do stool test.      Today's PHQ-2 Score:   PHQ-2 (  [employer]) 2019   Q1: Little interest or pleasure in doing things 0   Q2: Feeling down, depressed or hopeless 0   PHQ-2 Score 0   Q1: Little interest or pleasure in doing things Not at all   Q2: Feeling down, depressed or hopeless Not at all   PHQ-2 Score 0       Abuse: Current or Past(Physical, Sexual or Emotional)- No  Do you feel safe in your environment? Yes      Past Medical History:   Diagnosis Date     Genital herpes     on valtrex daily      PUD (peptic ulcer disease)     at age 14       Past Surgical History:   Procedure Laterality Date     HIP SURGERY Left      SHOULDER SURGERY      rt rotator cuff     VASECTOMY         Current Outpatient Medications   Medication Sig Dispense Refill     multivitamin (CENTRUM SILVER) tablet Take 1 tablet by mouth daily       omeprazole (PRILOSEC) 40 MG DR capsule Take 40 mg by mouth daily       valACYclovir (VALTREX) 500 MG tablet Take 500 mg by mouth daily         Family History   Problem Relation Age of Onset     Family History Negative Father          in MVA (hit by drunk )     Cancer Maternal Grandmother         breast     Cancer Maternal Grandfather         skin (Melanoma)     No Known Problems Son      No Known Problems Son        Social History     Tobacco Use     Smoking  "status: Never Smoker     Smokeless tobacco: Never Used   Substance Use Topics     Alcohol use: Yes     Comment: 10 drinks per week/alcohol         Alcohol Use 5/31/2019   Prescreen: >3 drinks/day or >7 drinks/week? No       Last PSA: No results found for: PSA    Reviewed orders with patient. Reviewed health maintenance and updated orders accordingly - Yes      Reviewed and updated as needed this visit by clinical staff  Tobacco  Allergies  Meds  Med Hx  Surg Hx  Fam Hx  Soc Hx        Reviewed and updated as needed this visit by Provider          Review of Systems   Constitutional: Negative for chills and fever.   HENT: Negative for congestion, ear pain, hearing loss and sore throat.    Eyes: Negative for pain and visual disturbance.   Respiratory: Negative for cough and shortness of breath.    Cardiovascular: Negative for chest pain, palpitations and peripheral edema.   Gastrointestinal: Negative for abdominal pain, constipation, diarrhea, heartburn, hematochezia and nausea.   Genitourinary: Negative for discharge, dysuria, frequency, genital sores, hematuria, impotence and urgency.   Musculoskeletal: Negative for arthralgias, joint swelling and myalgias.   Skin: Negative for rash.   Neurological: Negative for dizziness, weakness, headaches and paresthesias.   Psychiatric/Behavioral: Negative for mood changes. The patient is not nervous/anxious.         OBJECTIVE:   /76 (BP Location: Left arm, Patient Position: Sitting, Cuff Size: Adult Large)   Pulse 78   Temp 98  F (36.7  C) (Oral)   Resp 15   Ht 1.676 m (5' 6\")   Wt 90.3 kg (199 lb)   SpO2 95%   BMI 32.12 kg/m      Physical Exam  GENERAL: healthy, alert and no distress  EYES: Eyes grossly normal to inspection, PERRL and conjunctivae and sclerae normal  HENT: ear canals and TM's normal, nose and mouth without ulcers or lesions  NECK: no adenopathy, no asymmetry, masses, or scars and thyroid normal to palpation  RESP: lungs clear to auscultation " "- no rales, rhonchi or wheezes  CV: regular rate and rhythm, normal S1 S2, no S3 or S4, no murmur, click or rub, no peripheral edema and peripheral pulses strong  ABDOMEN: soft, nontender, no hepatosplenomegaly, no masses and bowel sounds normal  MS: no gross musculoskeletal defects noted, no edema  NEURO: Normal strength and tone, mentation intact and speech normal  PSYCH: mentation appears normal, affect normal/bright      ASSESSMENT/PLAN:       (Z00.00) Routine history and physical examination of adult  (primary encounter diagnosis)  Plan: Hemoglobin, Comprehensive metabolic panel,         Lipid panel reflex to direct LDL Fasting,         Prostate spec antigen screen, Fecal colorectal         cancer screen FIT          Patient is on omeprazole 40 mg daily with good symptom control, he was advised to decrease to  20 mg daily if symptoms are under control.        COUNSELING:   Reviewed preventive health counseling, as reflected in patient instructions       Regular exercise       Healthy diet/nutrition       Colon cancer screening       Prostate cancer screening    Estimated body mass index is 32.12 kg/m  as calculated from the following:    Height as of this encounter: 1.676 m (5' 6\").    Weight as of this encounter: 90.3 kg (199 lb).     Weight management plan: Discussed healthy diet and exercise guidelines     reports that he has never smoked. He has never used smokeless tobacco.      Counseling Resources:  ATP IV Guidelines  Pooled Cohorts Equation Calculator  FRAX Risk Assessment  ICSI Preventive Guidelines  Dietary Guidelines for Americans, 2010  USDA's MyPlate  ASA Prophylaxis  Lung CA Screening    Juan Luis Swift MD  [facility]  "

## 2019-06-07 LAB
ALBUMIN SERPL-MCNC: 3.8 G/DL (ref 3.4–5)
ALP SERPL-CCNC: 94 U/L (ref 40–150)
ALT SERPL W P-5'-P-CCNC: 31 U/L (ref 0–70)
ANION GAP SERPL CALCULATED.3IONS-SCNC: 7 MMOL/L (ref 3–14)
AST SERPL W P-5'-P-CCNC: 19 U/L (ref 0–45)
BILIRUB SERPL-MCNC: 0.5 MG/DL (ref 0.2–1.3)
BUN SERPL-MCNC: 18 MG/DL (ref 7–30)
CALCIUM SERPL-MCNC: 8.9 MG/DL (ref 8.5–10.1)
CHLORIDE SERPL-SCNC: 108 MMOL/L (ref 94–109)
CHOLEST SERPL-MCNC: 219 MG/DL
CO2 SERPL-SCNC: 29 MMOL/L (ref 20–32)
CREAT SERPL-MCNC: 1.07 MG/DL (ref 0.66–1.25)
GFR SERPL CREATININE-BSD FRML MDRD: 77 ML/MIN/{1.73_M2}
GLUCOSE SERPL-MCNC: 92 MG/DL (ref 70–99)
HDLC SERPL-MCNC: 53 MG/DL
LDLC SERPL CALC-MCNC: 126 MG/DL
NONHDLC SERPL-MCNC: 166 MG/DL
POTASSIUM SERPL-SCNC: 4.2 MMOL/L (ref 3.4–5.3)
PROT SERPL-MCNC: 7.1 G/DL (ref 6.8–8.8)
PSA SERPL-ACNC: 2.11 UG/L (ref 0–4)
SODIUM SERPL-SCNC: 144 MMOL/L (ref 133–144)
TRIGL SERPL-MCNC: 199 MG/DL

## 2019-11-07 ENCOUNTER — HEALTH MAINTENANCE LETTER (OUTPATIENT)
Age: 54
End: 2019-11-07

## 2019-12-19 ENCOUNTER — MYC MEDICAL ADVICE (OUTPATIENT)
Dept: INTERNAL MEDICINE | Facility: CLINIC | Age: 54
End: 2019-12-19

## 2019-12-19 DIAGNOSIS — A60.00 GENITAL HERPES SIMPLEX, UNSPECIFIED SITE: Primary | ICD-10-CM

## 2019-12-19 RX ORDER — VALACYCLOVIR HYDROCHLORIDE 500 MG/1
500 TABLET, FILM COATED ORAL DAILY
Qty: 90 TABLET | Refills: 3 | Status: SHIPPED | OUTPATIENT
Start: 2019-12-19 | End: 2021-01-12

## 2020-12-06 ENCOUNTER — HEALTH MAINTENANCE LETTER (OUTPATIENT)
Age: 55
End: 2020-12-06

## 2021-01-11 DIAGNOSIS — A60.00 GENITAL HERPES SIMPLEX, UNSPECIFIED SITE: ICD-10-CM

## 2021-01-12 RX ORDER — VALACYCLOVIR HYDROCHLORIDE 500 MG/1
TABLET, FILM COATED ORAL
Qty: 90 TABLET | Refills: 0 | Status: SHIPPED | OUTPATIENT
Start: 2021-01-12 | End: 2021-09-21

## 2021-01-12 NOTE — TELEPHONE ENCOUNTER
Pt is still in [place], from last April. Will send [employer] message to see if has moved there permanently, or if comes to [place] as well? And to schedule with a provider down there or up in [place].

## 2021-01-12 NOTE — TELEPHONE ENCOUNTER
Medication is being filled for 1 time refill only due to:  Patient needs to be seen because it has been more than one year since last visit.    Please call patient and assist with scheduling prior to additional refills.    Jonna YAO - Registered Nurse  [facility]  Acute and Diagnostic Services

## 2021-05-26 ENCOUNTER — RECORDS - HEALTHEAST (OUTPATIENT)
Dept: ADMINISTRATIVE | Facility: CLINIC | Age: 56
End: 2021-05-26

## 2021-09-18 DIAGNOSIS — A60.00 GENITAL HERPES SIMPLEX, UNSPECIFIED SITE: ICD-10-CM

## 2021-09-21 RX ORDER — VALACYCLOVIR HYDROCHLORIDE 500 MG/1
TABLET, FILM COATED ORAL
Qty: 90 TABLET | Refills: 0 | Status: SHIPPED | OUTPATIENT
Start: 2021-09-21

## 2021-09-25 ENCOUNTER — HEALTH MAINTENANCE LETTER (OUTPATIENT)
Age: 56
End: 2021-09-25

## 2022-01-15 ENCOUNTER — HEALTH MAINTENANCE LETTER (OUTPATIENT)
Age: 57
End: 2022-01-15

## 2022-02-17 PROBLEM — K21.9 GASTROESOPHAGEAL REFLUX DISEASE: Status: ACTIVE | Noted: 2020-04-27

## 2022-08-10 NOTE — PROGRESS NOTES
"  SUBJECTIVE:   Ishaan Keen is a 53 year old male who presents to clinic today for the following   health issues:        ED/UC Followup:    Facility:  [facility]  Date of visit: 5/6/19  Reason for visit: passed out  Current Status: better.  Denies lightheadedness, syncope, CP/LOPEZ/SOB/palpitations.  Had echo this AM             Additional history: as documented    Reviewed  and updated as needed this visit by clinical staff  Tobacco  Allergies  Meds  Med Hx  Surg Hx  Fam Hx  Soc Hx        Reviewed and updated as needed this visit by Provider             ROS:  CONSTITUTIONAL: NEGATIVE for fever, chills, change in weight  ENT/MOUTH: NEGATIVE for ear, mouth and throat problems  RESP: NEGATIVE for significant cough or SOB  CV: NEGATIVE for chest pain, palpitations or peripheral edema  GI: NEGATIVE for nausea, abdominal pain, heartburn, or change in bowel habits  NEURO: NEGATIVE for weakness, dizziness or paresthesias  PSYCHIATRIC: NEGATIVE for changes in mood or affect  ROS otherwise negative    OBJECTIVE:     /80 (BP Location: Right arm, Patient Position: Sitting, Cuff Size: Adult Large)   Pulse 73   Temp 97.7  F (36.5  C) (Oral)   Resp 20   Ht 1.676 m (5' 6\")   Wt 90.6 kg (199 lb 12.8 oz)   SpO2 99%   BMI 32.25 kg/m    Body mass index is 32.25 kg/m .  GENERAL: healthy, alert and no distress  NECK: no adenopathy, no asymmetry, masses, or scars and thyroid normal to palpation  RESP: lungs clear to auscultation - no rales, rhonchi or wheezes  CV: regular rate and rhythm, normal S1 S2, no S3 or S4, no murmur, click or rub, no peripheral edema and peripheral pulses strong  ABDOMEN: soft, nontender, no hepatosplenomegaly, no masses and bowel sounds normal  PSYCH: mentation appears normal and anxious        ASSESSMENT/PLAN:               ICD-10-CM    1. Acute kidney insufficiency N28.9 CBC with platelets     Basic metabolic panel       Good hydration,monitor sx, call prn    Deepika Brian, " NP  [facility]         fever

## 2023-01-07 ENCOUNTER — HEALTH MAINTENANCE LETTER (OUTPATIENT)
Age: 58
End: 2023-01-07

## 2023-04-22 ENCOUNTER — HEALTH MAINTENANCE LETTER (OUTPATIENT)
Age: 58
End: 2023-04-22

## 2023-08-05 NOTE — MR AVS SNAPSHOT
After Visit Summary   6/23/2017    Ishaan Keen    MRN: [id number]           Patient Information     Date Of Birth          1965        Visit Information        Provider Department      6/23/2017 1:40 PM Juan Luis Swift MD [facility]        Today's Diagnoses     Preop general physical exam    -  1    Inverted papilloma of nasal cavity          Care Instructions      Before Your Surgery      Call your surgeon if there is any change in your health. This includes signs of a cold or flu (such as a sore throat, runny nose, cough, rash or fever).    Do not smoke, drink alcohol or take over the counter medicine (unless your surgeon or primary care doctor tells you to) for the 24 hours before and after surgery.    If you take prescribed drugs: Follow your doctor s orders about which medicines to take and which to stop until after surgery.    Eating and drinking prior to surgery: follow the instructions from your surgeon    Take a shower or bath the night before surgery. Use the soap your surgeon gave you to gently clean your skin. If you do not have soap from your surgeon, use your regular soap. Do not shave or scrub the surgery site.  Wear clean pajamas and have clean sheets on your bed.           Follow-ups after your visit        Who to contact     If you have questions or need follow up information about today's clinic visit or your schedule please contact [facility] directly at [phone].  Normal or non-critical lab and imaging results will be communicated to you by MyChart, letter or phone within 4 business days after the clinic has received the results. If you do not hear from us within 7 days, please contact the clinic through MyChart or phone. If you have a critical or abnormal lab result, we will notify you by phone as soon as possible.  Submit refill requests through [employer] or call your pharmacy and they will forward the refill request to  "us. Please allow 3 business days for your refill to be completed.          Additional Information About Your Visit        [employer] Information     [employer] lets you send messages to your doctor, view your test results, renew your prescriptions, schedule appointments and more. To sign up, go to [email or web address] . Click on \"Log in\" on the left side of the screen, which will take you to the Welcome page. Then click on \"Sign up Now\" on the right side of the page.     You will be asked to enter the access code listed below, as well as some personal information. Please follow the directions to create your username and password.     Your access code is: [email or web address]  Expires: 2017  1:17 PM     Your access code will  in 90 days. If you need help or a new code, please call your [place] clinic or [phone].        Care EveryWhere ID     This is your [facility] EveryWhere ID. This could be used by other organizations to access your [place] medical records  [id number]        Your Vitals Were     Pulse Temperature Height Pulse Oximetry BMI (Body Mass Index)       74 97.6  F (36.4  C) (Oral) 5' 6.5\" (1.689 m) 97% 28.46 kg/m2        Blood Pressure from Last 3 Encounters:   17 122/68   17 130/84   07 112/78    Weight from Last 3 Encounters:   17 179 lb (81.2 kg)   17 199 lb (90.3 kg)   07 171 lb 6 oz (77.7 kg)              We Performed the Following     Basic metabolic panel     EKG 12-lead complete w/read - Clinics     Hemoglobin        Primary Care Provider    None Specified       No primary provider on file.        Equal Access to Services     [facility] DOREEN : Ana Thompson, jennifer goncalves, nancy gray. So [facility] [phone].    ATENCIÓN: Si habla español, tiene a church disposición servicios gratuitos de asistencia lingüística. Llame al [phone].    We comply with applicable federal civil rights laws and " [place] laws. We do not discriminate on the basis of race, color, national origin, age, disability sex, sexual orientation or gender identity.            Thank you!     Thank you for choosing [facility]  for your care. Our goal is always to provide you with excellent care. Hearing back from our patients is one way we can continue to improve our services. Please take a few minutes to complete the written survey that you may receive in the mail after your visit with us. Thank you!             Your Updated Medication List - Protect others around you: Learn how to safely use, store and throw away your medicines at www.disposemymeds.org.          This list is accurate as of: 6/23/17  1:46 PM.  Always use your most recent med list.                   Brand Name Dispense Instructions for use Diagnosis    NO ACTIVE MEDICATIONS      .           No

## 2024-06-23 ENCOUNTER — HEALTH MAINTENANCE LETTER (OUTPATIENT)
Age: 59
End: 2024-06-23